# Patient Record
Sex: FEMALE | Race: WHITE | Employment: FULL TIME | URBAN - METROPOLITAN AREA
[De-identification: names, ages, dates, MRNs, and addresses within clinical notes are randomized per-mention and may not be internally consistent; named-entity substitution may affect disease eponyms.]

---

## 2019-05-16 ENCOUNTER — HOSPITAL ENCOUNTER (EMERGENCY)
Facility: CLINIC | Age: 54
Discharge: HOME | End: 2019-05-16
Attending: EMERGENCY MEDICINE | Admitting: EMERGENCY MEDICINE
Payer: COMMERCIAL

## 2019-05-16 ENCOUNTER — APPOINTMENT (OUTPATIENT)
Dept: CT IMAGING | Facility: CLINIC | Age: 54
End: 2019-05-16
Payer: COMMERCIAL

## 2019-05-16 VITALS
SYSTOLIC BLOOD PRESSURE: 117 MMHG | RESPIRATION RATE: 18 BRPM | TEMPERATURE: 99.2 F | HEIGHT: 64 IN | WEIGHT: 180 LBS | BODY MASS INDEX: 30.73 KG/M2 | HEART RATE: 75 BPM | OXYGEN SATURATION: 99 % | DIASTOLIC BLOOD PRESSURE: 54 MMHG

## 2019-05-16 DIAGNOSIS — N20.1 URETEROLITHIASIS: ICD-10-CM

## 2019-05-16 DIAGNOSIS — R10.9 LEFT FLANK PAIN: ICD-10-CM

## 2019-05-16 LAB
ALBUMIN UR-MCNC: 50 MG/DL
ANION GAP SERPL CALCULATED.3IONS-SCNC: 9 MMOL/L (ref 3–14)
APPEARANCE UR: ABNORMAL
BACTERIA #/AREA URNS HPF: ABNORMAL /HPF
BASOPHILS # BLD AUTO: 0.1 10E9/L (ref 0–0.2)
BASOPHILS NFR BLD AUTO: 0.4 %
BILIRUB UR QL STRIP: NEGATIVE
BUN SERPL-MCNC: 13 MG/DL (ref 7–30)
CALCIUM SERPL-MCNC: 8.6 MG/DL (ref 8.5–10.1)
CHLORIDE SERPL-SCNC: 107 MMOL/L (ref 94–109)
CO2 SERPL-SCNC: 23 MMOL/L (ref 20–32)
COLOR UR AUTO: YELLOW
CREAT SERPL-MCNC: 0.93 MG/DL (ref 0.52–1.04)
DIFFERENTIAL METHOD BLD: ABNORMAL
EOSINOPHIL # BLD AUTO: 0.1 10E9/L (ref 0–0.7)
EOSINOPHIL NFR BLD AUTO: 0.5 %
ERYTHROCYTE [DISTWIDTH] IN BLOOD BY AUTOMATED COUNT: 12.4 % (ref 10–15)
GFR SERPL CREATININE-BSD FRML MDRD: 71 ML/MIN/{1.73_M2}
GLUCOSE SERPL-MCNC: 99 MG/DL (ref 70–99)
GLUCOSE UR STRIP-MCNC: NEGATIVE MG/DL
HCG UR QL: NEGATIVE
HCT VFR BLD AUTO: 44.3 % (ref 35–47)
HGB BLD-MCNC: 14.7 G/DL (ref 11.7–15.7)
HGB UR QL STRIP: ABNORMAL
IMM GRANULOCYTES # BLD: 0.1 10E9/L (ref 0–0.4)
IMM GRANULOCYTES NFR BLD: 0.7 %
KETONES UR STRIP-MCNC: NEGATIVE MG/DL
LEUKOCYTE ESTERASE UR QL STRIP: ABNORMAL
LYMPHOCYTES # BLD AUTO: 1.7 10E9/L (ref 0.8–5.3)
LYMPHOCYTES NFR BLD AUTO: 13.8 %
MCH RBC QN AUTO: 31.1 PG (ref 26.5–33)
MCHC RBC AUTO-ENTMCNC: 33.2 G/DL (ref 31.5–36.5)
MCV RBC AUTO: 94 FL (ref 78–100)
MONOCYTES # BLD AUTO: 0.5 10E9/L (ref 0–1.3)
MONOCYTES NFR BLD AUTO: 3.9 %
MUCOUS THREADS #/AREA URNS LPF: PRESENT /LPF
NEUTROPHILS # BLD AUTO: 9.8 10E9/L (ref 1.6–8.3)
NEUTROPHILS NFR BLD AUTO: 80.7 %
NITRATE UR QL: NEGATIVE
NRBC # BLD AUTO: 0 10*3/UL
NRBC BLD AUTO-RTO: 0 /100
PH UR STRIP: 5.5 PH (ref 5–7)
PLATELET # BLD AUTO: 319 10E9/L (ref 150–450)
POTASSIUM SERPL-SCNC: 3.9 MMOL/L (ref 3.4–5.3)
RBC # BLD AUTO: 4.73 10E12/L (ref 3.8–5.2)
RBC #/AREA URNS AUTO: >182 /HPF (ref 0–2)
SODIUM SERPL-SCNC: 139 MMOL/L (ref 133–144)
SOURCE: ABNORMAL
SP GR UR STRIP: 1.03 (ref 1–1.03)
SQUAMOUS #/AREA URNS AUTO: 5 /HPF (ref 0–1)
UROBILINOGEN UR STRIP-MCNC: NORMAL MG/DL (ref 0–2)
WBC # BLD AUTO: 12.2 10E9/L (ref 4–11)
WBC #/AREA URNS AUTO: 14 /HPF (ref 0–5)

## 2019-05-16 PROCEDURE — 96365 THER/PROPH/DIAG IV INF INIT: CPT

## 2019-05-16 PROCEDURE — 81001 URINALYSIS AUTO W/SCOPE: CPT | Performed by: PHYSICIAN ASSISTANT

## 2019-05-16 PROCEDURE — 87086 URINE CULTURE/COLONY COUNT: CPT | Performed by: PHYSICIAN ASSISTANT

## 2019-05-16 PROCEDURE — 96361 HYDRATE IV INFUSION ADD-ON: CPT

## 2019-05-16 PROCEDURE — 81025 URINE PREGNANCY TEST: CPT | Performed by: PHYSICIAN ASSISTANT

## 2019-05-16 PROCEDURE — 25000128 H RX IP 250 OP 636: Performed by: PHYSICIAN ASSISTANT

## 2019-05-16 PROCEDURE — 36415 COLL VENOUS BLD VENIPUNCTURE: CPT | Performed by: PHYSICIAN ASSISTANT

## 2019-05-16 PROCEDURE — 96375 TX/PRO/DX INJ NEW DRUG ADDON: CPT

## 2019-05-16 PROCEDURE — 99285 EMERGENCY DEPT VISIT HI MDM: CPT | Mod: 25

## 2019-05-16 PROCEDURE — 80048 BASIC METABOLIC PNL TOTAL CA: CPT | Performed by: PHYSICIAN ASSISTANT

## 2019-05-16 PROCEDURE — 74176 CT ABD & PELVIS W/O CONTRAST: CPT

## 2019-05-16 PROCEDURE — 25000132 ZZH RX MED GY IP 250 OP 250 PS 637: Performed by: PHYSICIAN ASSISTANT

## 2019-05-16 PROCEDURE — 25000128 H RX IP 250 OP 636: Performed by: EMERGENCY MEDICINE

## 2019-05-16 PROCEDURE — 85025 COMPLETE CBC W/AUTO DIFF WBC: CPT | Performed by: PHYSICIAN ASSISTANT

## 2019-05-16 RX ORDER — HYDROCODONE BITARTRATE AND ACETAMINOPHEN 5; 325 MG/1; MG/1
1 TABLET ORAL EVERY 6 HOURS PRN
Qty: 10 TABLET | Refills: 0 | Status: SHIPPED | OUTPATIENT
Start: 2019-05-16 | End: 2019-05-19

## 2019-05-16 RX ORDER — CEFTRIAXONE 1 G/1
1 INJECTION, POWDER, FOR SOLUTION INTRAMUSCULAR; INTRAVENOUS ONCE
Status: COMPLETED | OUTPATIENT
Start: 2019-05-16 | End: 2019-05-16

## 2019-05-16 RX ORDER — TAMSULOSIN HYDROCHLORIDE 0.4 MG/1
0.4 CAPSULE ORAL DAILY
Qty: 10 CAPSULE | Refills: 0 | Status: SHIPPED | OUTPATIENT
Start: 2019-05-16 | End: 2019-05-26

## 2019-05-16 RX ORDER — KETOROLAC TROMETHAMINE 30 MG/ML
30 INJECTION, SOLUTION INTRAMUSCULAR; INTRAVENOUS ONCE
Status: COMPLETED | OUTPATIENT
Start: 2019-05-16 | End: 2019-05-16

## 2019-05-16 RX ORDER — ACETAMINOPHEN 325 MG/1
975 TABLET ORAL ONCE
Status: COMPLETED | OUTPATIENT
Start: 2019-05-16 | End: 2019-05-16

## 2019-05-16 RX ADMIN — CEFTRIAXONE SODIUM 1 G: 1 INJECTION, POWDER, FOR SOLUTION INTRAMUSCULAR; INTRAVENOUS at 13:49

## 2019-05-16 RX ADMIN — KETOROLAC TROMETHAMINE 30 MG: 30 INJECTION INTRAMUSCULAR; INTRAVENOUS at 12:02

## 2019-05-16 RX ADMIN — ACETAMINOPHEN 975 MG: 325 TABLET, FILM COATED ORAL at 12:04

## 2019-05-16 RX ADMIN — SODIUM CHLORIDE 1000 ML: 9 INJECTION, SOLUTION INTRAVENOUS at 12:05

## 2019-05-16 ASSESSMENT — ENCOUNTER SYMPTOMS
ABDOMINAL PAIN: 1
DIARRHEA: 1
HEMATURIA: 0
BLOOD IN STOOL: 0
NAUSEA: 0
FEVER: 0
VOMITING: 0
CONSTIPATION: 1
DYSURIA: 0
FREQUENCY: 0

## 2019-05-16 ASSESSMENT — MIFFLIN-ST. JEOR: SCORE: 1416.47

## 2019-05-16 NOTE — ED TRIAGE NOTES
Pt with left flank pain which started an hour PTA, denies injury, denies n/v. Denies hx of stones. ABC's intact, alert and oriented x3.

## 2019-05-16 NOTE — ED AVS SNAPSHOT
[facility] Emergency Department  [address]  [facility] [address]  Phone:  [phone]  Fax:  [phone]                                    Anna Santos   MRN: [id number]    Department:  [facility] Emergency Department   Date of Visit:  5/16/2019           After Visit Summary Signature Page    I have received my discharge instructions, and my questions have been answered. I have discussed any challenges I see with this plan with the nurse or doctor.    ..........................................................................................................................................  Patient/Patient Representative Signature      ..........................................................................................................................................  Patient Representative Print Name and Relationship to Patient    ..................................................               ................................................  Date                                   Time    ..........................................................................................................................................  Reviewed by Signature/Title    ...................................................              ..............................................  Date                                               Time          22EPIC Rev 08/18

## 2019-05-16 NOTE — ED PROVIDER NOTES
History     Chief Complaint:    Abdominal Pain    HPI   Anna Santos is a 53 year old female who presents with abdominal pain. The patient report that 1 hour ago she was having abdominal discomfort therefore tried to have a bowel movement when she suddenly felt a 10/10 severity of pain in her left abdomin that she describes is worse than childbirth. The patient notes that she did have a bowel movement that was hard but without noted blood. To combat the pain, she took 2 ibuprofen 2 hours ago. The patient denies hematuria, dysuria,  fever, nausea, vomiting, frequency, urgency, vaginal discharge, odor, or itching, history of abdominal surgeries, or history of diverticulitis. Over the weekend and past few days, the patient notes that her bowel movements were inconsistent with constipation and diarrhea intermittently compared to her normal daily bowel movements. She notes that she occasionally has a menstrual cycle, most recently 1 month ago. The patient has a history of ovarian cysts that ruptured but this pain is worse and she recalls a possible kidney stone in 2007. Of note, she had a chiropractor appointment yesterday for her back and hip.     Allergies:  No known drug allergies.       Medications:    Aspirin    Past Medical History:    Medical history reviewed. No pertinent medical history.      Past Surgical History:    Orthopedic surgery     Family History:    Family history reviewed. No pertinent family history.     Social History:  The patient was accompanied to the ED by .  Smoking Status: Never Smoker  Smokeless Tobacco: Never Used  Alcohol Use: Positive  Drug Use: Negative  PCP: Quinten Dahl   Marital Status:        Review of Systems   Constitutional: Negative for fever.   Gastrointestinal: Positive for abdominal pain, constipation and diarrhea. Negative for blood in stool, nausea and vomiting.   Genitourinary: Negative for dysuria, frequency, hematuria, urgency and vaginal  "discharge (or itching, or odor).     Physical Exam     Patient Vitals for the past 24 hrs:   BP Temp Temp src Pulse Resp SpO2 Height Weight   05/16/19 1400 117/54 -- -- 75 -- 99 % -- --   05/16/19 1345 124/63 -- -- -- -- -- -- --   05/16/19 1054 134/69 99.2  F (37.3  C) Temporal 81 18 100 % 1.626 m (5' 4\") 81.6 kg (180 lb)      Physical Exam  General: Resting on the bed.  Appears mildly uncomfortable.  Skin: Good turgor, no rash, no unusual bruising or prominent lesions.  HEENT: Head: Normocephalic, atraumatic, no visible masses.   Eyes: Conjunctiva clear.  Throat/pharynx: Mucous membranes moist, no mucosal lesions.   Cardiac: Normal rate and regular rhythm, no murmur or gallop.   Lungs: Clear to auscultation.  Abdomen: Mild tenderness palpation in the left lower quadrant.  Abdomen soft, no rebound or guarding.  No CVA tenderness bilaterally.  Musculoskeletal: Normal gait and station.  Neurologic: Oriented x 3. GCS: 15.  Psychiatric: Intact recent and remote memory, judgment and insight, normal mood and affect.     Emergency Department Course     Imaging:  Radiology findings were communicated with the patient who voiced understanding of the findings.    CT Abdomen Pelvis w/o Contrast   Preliminary Result   IMPRESSION:   1. Left hydronephrosis and hydroureter related to a 5 mm calculus in   the distal left ureter.   2. Multiple hypodense liver lesions are too small to characterize but   statistically likely to be benign.        Reading per radiology     Laboratory:  Laboratory findings were communicated with the patient who voiced understanding of the findings.    CBC: WBC 12.2 (H), HGB 14.7,   BMP: WNL (Creatinine 0.93)  UA: Blood large (A), Protein Albumin 50 (A), Leukocyte Esterase small (A), WBC 14 (H), RBC >182 (H), Bacteria few (A), Squamous Epithelial 5 (H), Mucous present (A), o/w WNL.  Urine Culture: Pending  HCG Qualitative: Negative     Interventions:  1202 Toradol 30 mg IV  1204 Tylenol 975 mg " PO  1205 NS 1000 mL IV  1349 Rocephin 1 g IV    Emergency Department Course:     Nursing notes and vitals reviewed.    1115 I performed an exam of the patient as documented above.     1152 The patient provided a urine sample here in the emergency department. This was sent for laboratory testing, findings above.     1226 IV was inserted and blood was drawn for laboratory testing, results above.     1300 The patient was sent for a CT while in the emergency department, results above.      1402 I spoke with Dr. Pulliam of the urology service regarding patient's presentation, findings, and plan of care.     1410 I personally reviewed the imaging and lab results with the patient and answered all related questions prior to discharge.    Impression & Plan      Medical Decision Making:  Anna Santos is a 53 year old female who presents to the emergency department today for evaluation of  unilateral flank and abdominal pain consistent with renal colic. CT confirms a ureteral stone. Pain is controlled with interventions in the Emergency Department.  The patient did have a mild temperature elevation and leukocytosis at 12.2 on laboratory analysis.  There is also small amount of leukocyte esterase, WBCs, bacteria in her UA.  Due to these initial results, she was given a dose of ceftriaxone here.  I think consulted with Dr. Hayes of urology in regards to the patient's presentation and work-up.  He did not believe that these were signs of infected stone.  He did not advise antibiotics but follow-up with urology as needed.  The urine has been sent for culture and is pending.  The patient will be discharged with opioid analgesics and Ibuprofen for pain. Side effects of narcotic medications were discussed. Flomax will be prescribed daily to attempt to ease stone passage.  Other etiologies for these symptoms such as AAA, pyelonephritis, muscular strain, appendicitis, amongst others are considered and have been excluded.  The patient  was told to return if if increasing pain not controlled with pain meds, vomiting, and fever.  Follow up with urology within one week if she does not pass the stone, sooner if pain continues.  She was given a strainer for her urine.  All questions were answered prior to the patient's discharge. She was in agreement with the plan stated above.     Diagnosis:    ICD-10-CM    1. Ureterolithiasis N20.1 Urine Culture   2. Left flank pain R10.9       Disposition:   The patient is discharged to home.     Discharge Medications:       START taking      Dose / Directions   tamsulosin 0.4 MG capsule  Commonly known as:  FLOMAX      Dose:  0.4 mg  Take 1 capsule (0.4 mg) by mouth daily for 10 doses  Quantity:  10 capsule  Refills:  0        CONTINUE these medicines which may have CHANGED, or have new prescriptions. If we are uncertain of the size of tablets/capsules you have at home, strength may be listed as something that might have changed.      Dose / Directions   HYDROcodone-acetaminophen 5-325 MG tablet  Commonly known as:  NORCO  This may have changed:      how much to take    reasons to take this      Dose:  1 tablet  Take 1 tablet by mouth every 6 hours as needed for severe pain  Quantity:  10 tablet  Refills:  0           Where to get your medicines      Some of these will need a paper prescription and others can be bought over the counter. Ask your nurse if you have questions.    Bring a paper prescription for each of these medications    HYDROcodone-acetaminophen 5-325 MG tablet    tamsulosin 0.4 MG capsule         Scribe Disclosure:  I, Orla Severson, am serving as a scribe at 11:21 AM on 5/16/2019 to document services personally performed by Mari GARCIA based on my observations and the provider's statements to me.     [facility] EMERGENCY DEPARTMENT    This was created at least in part with a voice recognition software. Mistakes/typos may be present.        Mari Abraham PA  05/16/19  [id number]

## 2019-05-16 NOTE — DISCHARGE INSTRUCTIONS
Begin taking the Flomax at home.  Use ibuprofen for pain control.  If pain is not controlled, use the Norco.  Return for fevers, chills, worsening symptoms.  Strain your urine at home.  If you do not pass the stone in the next 2 days, follow-up with Dr. Hayes, information above.  Your urine has been sent for culture.  He will get called if results show bacteria.       Discharge Instructions  Kidney Stones    Kidney stones are a common problem that can cause a lot of pain but fortunately are usually not dangerous and can be generally treated with medicine at home.  However, sometimes your condition may be worse than it seemed at first, or may get worse with time.     You need to follow-up with your regular doctor within 3 days.    Most kidney stones will pass on their own, but occasionally stones may need to be removed by an urologist. We will send you home with a urine strainer. Be sure to urinate into this, or urinate into a container and pour the urine through the fine filter to catch the kidney stone as it comes out. The stone will seem like a pebble or grain of sand. Be sure to save this in a Ziploc  bag and take it to the doctor?s office with you.       Return to the Emergency Department if:  Your pain is not controlled.  You are vomiting and can?t keep fluids or medications down.  You develop fever (>101).  You feel much more ill or develop new symptoms.  What can I do to help myself?  Be sure to drink plenty of fluids.  Staying active is good, and may help the stone to pass. You may do whatever you feel up to doing without restrictions.   Treatment:  Non-steroidal anti-inflammatory drugs (NSAIDs). This includes prescription medicines like Toradol  (ketorolac) and non-prescription medicines like Advil  (ibuprofen) and Nuprin  (ibuprofen). These pain relievers are very effective for kidney stones.  Narcotic pain pills. If you have been given a narcotic such as Vicodin  (hydrocodone with acetaminophen),  Percocet  (oxycodone with acetaminophen), or codeine, do not drive for four hours after you have taken it. If the narcotic contains Tylenol  (acetaminophen), do not take Tylenol  with it. All narcotics will cause constipation, so eat a high fiber diet.    Nausea medication.  Nausea and vomiting are common with kidney stones, so your physician may send you home with medicine for this.   Flomax  (tamsulosin). This medicine is sometimes used for men with prostate problems, but also can help kidney stones to pass. This medicine can lower blood pressure, and you may feel faint, especially when you first stand up. Be sure to get up gradually, sit down if you feel faint, and avoid activity where feeling faint would be dangerous, such as climbing ladders.   If you were given a prescription for medicine here today, be sure to read all of the information (including the package insert) that comes with your prescription.  This will include important information about the medicine, its side effects, and any warnings that you need to know about.  The pharmacist who fills the prescription can provide more information and answer questions you may have about the medicine.  If you have questions or concerns that the pharmacist cannot address, please call or return to the Emergency Department.   Opioid Medication Information    Pain medications are among the most commonly prescribed medicines, so we are including this information for all our patients. If you did not receive pain medication or get a prescription for pain medicine, you can ignore it.     You may have been given a prescription for an opioid (narcotic) pain medicine and/or have received a pain medicine while here in the Emergency Department. These medicines can make you drowsy or impaired. You must not drive, operate dangerous equipment, or engage in any other dangerous activities while taking these medications. If you drive while taking these medications, you could be  arrested for DUI, or driving under the influence. Do not drink any alcohol while you are taking these medications.     Opioid pain medications can cause addiction. If you have a history of chemical dependency of any type, you are at a higher risk of becoming addicted to pain medications.  Only take these prescribed medications to treat your pain when all other options have been tried. Take it for as short a time and as few doses as possible. Store your pain pills in a secure place, as they are frequently stolen and provide a dangerous opportunity for children or visitors in your house to start abusing these powerful medications. We will not replace any lost or stolen medicine.  As soon as your pain is better, you should flush all your remaining medication.     Many prescription pain medications contain Tylenol  (acetaminophen), including Vicodin , Tylenol #3 , Norco , Lortab , and Percocet .  You should not take any extra pills of Tylenol  if you are using these prescription medications or you can get very sick.  Do not ever take more than 3000 mg of acetaminophen in any 24 hour period.    All opioids tend to cause constipation. Drink plenty of water and eat foods that have a lot of fiber, such as fruits, vegetables, prune juice, apple juice and high fiber cereal.  Take a laxative if you don?t move your bowels at least every other day. Miralax , Milk of Magnesia, Colace , or Senna  can be used to keep you regular.      Remember that you can always come back to the Emergency Department if you are not able to see your regular doctor in the amount of time listed above, if you get any new symptoms, or if there is anything that worries you.

## 2019-05-17 LAB
BACTERIA SPEC CULT: NORMAL
BACTERIA SPEC CULT: NORMAL
Lab: NORMAL
SPECIMEN SOURCE: NORMAL

## 2019-05-20 ENCOUNTER — HOSPITAL ENCOUNTER (OUTPATIENT)
Dept: GENERAL RADIOLOGY | Facility: CLINIC | Age: 54
Discharge: HOME | End: 2019-05-20
Attending: UROLOGY | Admitting: UROLOGY
Payer: COMMERCIAL

## 2019-05-20 DIAGNOSIS — N20.0 KIDNEY STONE: ICD-10-CM

## 2019-05-20 DIAGNOSIS — N20.0 CALCULUS OF KIDNEY: Primary | ICD-10-CM

## 2019-05-20 DIAGNOSIS — N20.0 KIDNEY STONE: Primary | ICD-10-CM

## 2019-05-20 PROCEDURE — 74019 RADEX ABDOMEN 2 VIEWS: CPT

## 2019-05-21 ENCOUNTER — OFFICE VISIT (OUTPATIENT)
Dept: UROLOGY | Facility: CLINIC | Age: 54
End: 2019-05-21
Payer: COMMERCIAL

## 2019-05-21 VITALS
HEART RATE: 70 BPM | HEIGHT: 64 IN | DIASTOLIC BLOOD PRESSURE: 80 MMHG | BODY MASS INDEX: 30.73 KG/M2 | OXYGEN SATURATION: 98 % | WEIGHT: 180 LBS | SYSTOLIC BLOOD PRESSURE: 118 MMHG

## 2019-05-21 DIAGNOSIS — N20.0 CALCULUS OF KIDNEY: ICD-10-CM

## 2019-05-21 DIAGNOSIS — N20.1 URETERAL STONE: Primary | ICD-10-CM

## 2019-05-21 LAB
ALBUMIN UR-MCNC: NEGATIVE MG/DL
APPEARANCE UR: CLEAR
BILIRUB UR QL STRIP: NEGATIVE
COLOR UR AUTO: YELLOW
GLUCOSE UR STRIP-MCNC: NEGATIVE MG/DL
HGB UR QL STRIP: ABNORMAL
KETONES UR STRIP-MCNC: NEGATIVE MG/DL
LEUKOCYTE ESTERASE UR QL STRIP: NEGATIVE
NITRATE UR QL: NEGATIVE
PH UR STRIP: 7 PH (ref 5–7)
SOURCE: ABNORMAL
SP GR UR STRIP: 1.02 (ref 1–1.03)
UROBILINOGEN UR STRIP-ACNC: 0.2 EU/DL (ref 0.2–1)

## 2019-05-21 PROCEDURE — 81003 URINALYSIS AUTO W/O SCOPE: CPT | Performed by: UROLOGY

## 2019-05-21 PROCEDURE — 99203 OFFICE O/P NEW LOW 30 MIN: CPT | Performed by: UROLOGY

## 2019-05-21 ASSESSMENT — MIFFLIN-ST. JEOR: SCORE: 1416.47

## 2019-05-21 ASSESSMENT — PAIN SCALES - GENERAL: PAINLEVEL: NO PAIN (0)

## 2019-05-21 NOTE — NURSING NOTE
Chief Complaint   Patient presents with     Clinic Care Coordination - Follow-up     Pt here for kidney stone     Ashlee Mayfield, CMA

## 2019-05-21 NOTE — PROGRESS NOTES
[facility]  Main Office: [address]  [address]  [place] [address]       CHIEF COMPLAINT:   Left ureteral stone    HISTORY:   I was asked by Mari Abraham in the emergency department to see this 53-year-old woman who was in the emergency department last week with left-sided Abdominal pain. She had a CT scan and was diagnosed with a 5 mm distal left ureteral stone. There were no other stones present in the kidneys. Her pain was controlled in the emergency department. Her urinalysis showed hematuria but otherwise no signs of infection. She was discharged with pain medication and Flomax. Since leaving the emergency department she has done very well. She has had no pain whatsoever. She is asymptomatic from the stone. No fevers chills nausea or vomiting.      PAST MEDICAL HISTORY:   Past Medical History:   Diagnosis Date     STD (sexually transmitted disease)        PAST SURGICAL HISTORY:     Past Surgical History:   Procedure Laterality Date     ORTHOPEDIC SURGERY      back       FAMILY HISTORY:   History reviewed. No pertinent family history.    SOCIAL HISTORY:   Social History     Tobacco Use     Smoking status: Never Smoker     Smokeless tobacco: Never Used   Substance Use Topics     Alcohol use: Not on file        ALLERGIES:  No Known Allergies    MEDICATIONS:     Current Outpatient Medications:      ASPIRIN PO, , Disp: , Rfl:      tamsulosin (FLOMAX) 0.4 MG capsule, Take 1 capsule (0.4 mg) by mouth daily for 10 doses, Disp: 10 capsule, Rfl: 0     Cholecalciferol (VITAMIN D3 PO), Take by mouth daily, Disp: , Rfl:      UNKNOWN TO PATIENT, , Disp: , Rfl:     REVIEW OF SYSTEMS:  Allergic/Immunologic: negative  Constitutional Symptoms: negative   Fever: none   Weight loss: none   Other: none  Hematologic/Lymphatic: negative  Integumentary: negative   Breast: negative   Hair: negative   Skin: negative   Skin: negative  Eyes: negative  Ears/Nose/Throat: negative  Respiratory: negative  Cardiovascular:  negative  Gastrointestinal: negative  Genitourinary: negative  Musculoskeletal: negative  Neurologic: negative  Psychiatric: negative  Reproductive System: negative  Endocrine: negative      PHYSICAL EXAM:  VS: HR: Data Unavailable    BP: Data Unavailable      WT: 0 lbs 0 oz       HT: Data Unavailable    General appearance: In NAD, conversant  HEENT: normocephalic and atraumatic, anicteric sclera  Lymph Nodes: not examined  Cardiovascular: not examined  Respiratory: normal, non-labored breathing  Abdomen: soft, non-tender, and non-distended  Back/Flank: not examined  Peripheral Vascular/extremity: no peripheral edema  Lymphatic: not examined  Skin: Normal temperature, turgor, and texture. No rash  Psychiatric: Appropriate affect. Alert and Oriented to person, place, and time    Pelvic:    External genitalia: not examined   Urethra: not examined   Vagina: not examined      Cystoscopy:     Laboratory Studies:     Imaging Studies: I reviewed her CT scan images and her KUB images from today.5 mm distal left ureteral stone seen in both images. Unchanged in position.      CLINICAL IMPRESSION:   Distal left ureteral stone    PLAN:   She has a 5 mm distal left ureteral stone. No other stones present.The stone appears to still be present on the KUB today. We discussed options of trial of passage versus ureteroscopy. We discussed the pros and cons of each approach. Since she is not having any pain she would like to continue with trial of passage. She'll continue to take the Flomax. She will take pain medication as needed if she has more episodes of pain.She will strain her urine. If she does not pass the stone by 1 month for now I will see her back at that time for another KUB and urinalysis.      Chet Kingston M.D.

## 2019-05-21 NOTE — LETTER
5/21/2019     RE: Anna Santos  [address] [place] [address]     Dear Colleague,    Thank you for referring your patient, Anna Santos, to the [facility] [place] [facility]. Please see a copy of my visit note below.    [facility]  Main Office: [address]  [address]  [place] [address]       CHIEF COMPLAINT:   Left ureteral stone    HISTORY:   I was asked by Mari Abraham in the emergency department to see this 53-year-old woman who was in the emergency department last week with left-sided Abdominal pain. She had a CT scan and was diagnosed with a 5 mm distal left ureteral stone. There were no other stones present in the kidneys. Her pain was controlled in the emergency department. Her urinalysis showed hematuria but otherwise no signs of infection. She was discharged with pain medication and Flomax. Since leaving the emergency department she has done very well. She has had no pain whatsoever. She is asymptomatic from the stone. No fevers chills nausea or vomiting.      PAST MEDICAL HISTORY:   Past Medical History:   Diagnosis Date     STD (sexually transmitted disease)        PAST SURGICAL HISTORY:     Past Surgical History:   Procedure Laterality Date     ORTHOPEDIC SURGERY      back       FAMILY HISTORY:   History reviewed. No pertinent family history.    SOCIAL HISTORY:   Social History     Tobacco Use     Smoking status: Never Smoker     Smokeless tobacco: Never Used   Substance Use Topics     Alcohol use: Not on file        ALLERGIES:  No Known Allergies    MEDICATIONS:     Current Outpatient Medications:      ASPIRIN PO, , Disp: , Rfl:      tamsulosin (FLOMAX) 0.4 MG capsule, Take 1 capsule (0.4 mg) by mouth daily for 10 doses, Disp: 10 capsule, Rfl: 0     Cholecalciferol (VITAMIN D3 PO), Take by mouth daily, Disp: , Rfl:      UNKNOWN TO PATIENT, , Disp: , Rfl:     REVIEW OF  SYSTEMS:  Allergic/Immunologic: negative  Constitutional Symptoms: negative   Fever: none   Weight loss: none   Other: none  Hematologic/Lymphatic: negative  Integumentary: negative   Breast: negative   Hair: negative   Skin: negative   Skin: negative  Eyes: negative  Ears/Nose/Throat: negative  Respiratory: negative  Cardiovascular: negative  Gastrointestinal: negative  Genitourinary: negative  Musculoskeletal: negative  Neurologic: negative  Psychiatric: negative  Reproductive System: negative  Endocrine: negative      PHYSICAL EXAM:  VS: HR: Data Unavailable    BP: Data Unavailable      WT: 0 lbs 0 oz       HT: Data Unavailable    General appearance: In NAD, conversant  HEENT: normocephalic and atraumatic, anicteric sclera  Lymph Nodes: not examined  Cardiovascular: not examined  Respiratory: normal, non-labored breathing  Abdomen: soft, non-tender, and non-distended  Back/Flank: not examined  Peripheral Vascular/extremity: no peripheral edema  Lymphatic: not examined  Skin: Normal temperature, turgor, and texture. No rash  Psychiatric: Appropriate affect. Alert and Oriented to person, place, and time    Pelvic:    External genitalia: not examined   Urethra: not examined   Vagina: not examined      Cystoscopy:     Laboratory Studies:     Imaging Studies: I reviewed her CT scan images and her KUB images from today.5 mm distal left ureteral stone seen in both images. Unchanged in position.      CLINICAL IMPRESSION:   Distal left ureteral stone    PLAN:   She has a 5 mm distal left ureteral stone. No other stones present.The stone appears to still be present on the KUB today. We discussed options of trial of passage versus ureteroscopy. We discussed the pros and cons of each approach. Since she is not having any pain she would like to continue with trial of passage. She'll continue to take the Flomax. She will take pain medication as needed if she has more episodes of pain.She will strain her urine. If she does  not pass the stone by 1 month for now I will see her back at that time for another KUB and urinalysis.      Chet Kingston M.D.

## 2019-05-23 DIAGNOSIS — N20.1 URETERAL STONE: Primary | ICD-10-CM

## 2019-05-23 RX ORDER — TAMSULOSIN HYDROCHLORIDE 0.4 MG/1
0.4 CAPSULE ORAL DAILY
Qty: 14 CAPSULE | Refills: 1 | Status: SHIPPED | OUTPATIENT
Start: 2019-05-23

## 2019-06-18 DIAGNOSIS — Z87.442 HISTORY OF RENAL CALCULI: ICD-10-CM

## 2019-06-18 DIAGNOSIS — N20.0 KIDNEY STONE: Primary | ICD-10-CM

## 2020-06-10 ENCOUNTER — TRANSFERRED RECORDS (OUTPATIENT)
Dept: HEALTH INFORMATION MANAGEMENT | Facility: CLINIC | Age: 55
End: 2020-06-10

## 2020-06-11 LAB
CHOLEST SERPL-MCNC: 224 MG/DL (ref 100–199)
EJECTION FRACTION: 62 %
HDLC SERPL-MCNC: 47 MG/DL
LDLC SERPL CALC-MCNC: 135 MG/DL
NONHDLC SERPL-MCNC: 177 MG/DL
TRIGL SERPL-MCNC: 212 MG/DL

## 2020-06-12 LAB — EJECTION FRACTION: 57 %

## 2020-07-08 ENCOUNTER — HOSPITAL ENCOUNTER (OUTPATIENT)
Dept: CARDIAC REHAB | Facility: CLINIC | Age: 55
End: 2020-07-08
Attending: PHYSICIAN ASSISTANT
Payer: COMMERCIAL

## 2020-07-08 PROCEDURE — 93798 PHYS/QHP OP CAR RHAB W/ECG: CPT | Performed by: REHABILITATION PRACTITIONER

## 2020-07-08 PROCEDURE — 40000116 ZZH STATISTIC OP CR VISIT: Performed by: REHABILITATION PRACTITIONER

## 2020-07-08 PROCEDURE — 93797 PHYS/QHP OP CAR RHAB WO ECG: CPT | Performed by: REHABILITATION PRACTITIONER

## 2020-07-08 PROCEDURE — 40000575 ZZH STATISTIC OP CARDIAC VISIT #2: Performed by: REHABILITATION PRACTITIONER

## 2020-07-10 ENCOUNTER — HOSPITAL ENCOUNTER (OUTPATIENT)
Dept: CARDIAC REHAB | Facility: CLINIC | Age: 55
End: 2020-07-10
Attending: INTERNAL MEDICINE
Payer: COMMERCIAL

## 2020-07-10 PROCEDURE — 93798 PHYS/QHP OP CAR RHAB W/ECG: CPT

## 2020-07-10 PROCEDURE — 40000116 ZZH STATISTIC OP CR VISIT

## 2020-07-16 ENCOUNTER — HOSPITAL ENCOUNTER (OUTPATIENT)
Dept: CARDIAC REHAB | Facility: CLINIC | Age: 55
End: 2020-07-16
Attending: INTERNAL MEDICINE
Payer: COMMERCIAL

## 2020-07-16 PROCEDURE — 93798 PHYS/QHP OP CAR RHAB W/ECG: CPT

## 2020-07-16 PROCEDURE — 40000116 ZZH STATISTIC OP CR VISIT

## 2020-07-21 ENCOUNTER — HOSPITAL ENCOUNTER (OUTPATIENT)
Dept: CARDIAC REHAB | Facility: CLINIC | Age: 55
End: 2020-07-21
Attending: INTERNAL MEDICINE
Payer: COMMERCIAL

## 2020-07-21 PROCEDURE — 40000116 ZZH STATISTIC OP CR VISIT

## 2020-07-21 PROCEDURE — 93798 PHYS/QHP OP CAR RHAB W/ECG: CPT

## 2020-07-23 ENCOUNTER — HOSPITAL ENCOUNTER (OUTPATIENT)
Dept: CARDIAC REHAB | Facility: CLINIC | Age: 55
End: 2020-07-23
Attending: INTERNAL MEDICINE
Payer: COMMERCIAL

## 2020-07-23 PROCEDURE — 40000116 ZZH STATISTIC OP CR VISIT: Performed by: OCCUPATIONAL THERAPIST

## 2020-07-23 PROCEDURE — 93798 PHYS/QHP OP CAR RHAB W/ECG: CPT | Performed by: OCCUPATIONAL THERAPIST

## 2020-07-28 ENCOUNTER — HOSPITAL ENCOUNTER (OUTPATIENT)
Dept: CARDIAC REHAB | Facility: CLINIC | Age: 55
End: 2020-07-28
Attending: INTERNAL MEDICINE
Payer: COMMERCIAL

## 2020-07-28 PROCEDURE — 40000116 ZZH STATISTIC OP CR VISIT

## 2020-07-28 PROCEDURE — 93798 PHYS/QHP OP CAR RHAB W/ECG: CPT

## 2020-08-04 ENCOUNTER — HOSPITAL ENCOUNTER (OUTPATIENT)
Dept: CARDIAC REHAB | Facility: CLINIC | Age: 55
End: 2020-08-04
Attending: INTERNAL MEDICINE
Payer: COMMERCIAL

## 2020-08-04 PROCEDURE — 93798 PHYS/QHP OP CAR RHAB W/ECG: CPT

## 2020-08-04 PROCEDURE — 40000116 ZZH STATISTIC OP CR VISIT

## 2020-08-06 ENCOUNTER — HOSPITAL ENCOUNTER (OUTPATIENT)
Dept: CARDIAC REHAB | Facility: CLINIC | Age: 55
End: 2020-08-06
Attending: INTERNAL MEDICINE
Payer: COMMERCIAL

## 2020-08-06 PROCEDURE — 40000116 ZZH STATISTIC OP CR VISIT

## 2020-08-06 PROCEDURE — 93798 PHYS/QHP OP CAR RHAB W/ECG: CPT

## 2020-08-11 ENCOUNTER — HOSPITAL ENCOUNTER (OUTPATIENT)
Dept: CARDIAC REHAB | Facility: CLINIC | Age: 55
End: 2020-08-11
Attending: INTERNAL MEDICINE
Payer: COMMERCIAL

## 2020-08-11 PROCEDURE — 93798 PHYS/QHP OP CAR RHAB W/ECG: CPT | Performed by: REHABILITATION PRACTITIONER

## 2020-08-11 PROCEDURE — 40000116 ZZH STATISTIC OP CR VISIT: Performed by: REHABILITATION PRACTITIONER

## 2020-08-13 ENCOUNTER — HOSPITAL ENCOUNTER (OUTPATIENT)
Dept: CARDIAC REHAB | Facility: CLINIC | Age: 55
End: 2020-08-13
Attending: INTERNAL MEDICINE
Payer: COMMERCIAL

## 2020-08-13 PROCEDURE — 93798 PHYS/QHP OP CAR RHAB W/ECG: CPT

## 2020-08-13 PROCEDURE — 40000116 ZZH STATISTIC OP CR VISIT

## 2020-08-18 ENCOUNTER — HOSPITAL ENCOUNTER (OUTPATIENT)
Dept: CARDIAC REHAB | Facility: CLINIC | Age: 55
End: 2020-08-18
Attending: INTERNAL MEDICINE
Payer: COMMERCIAL

## 2020-08-18 PROCEDURE — 93798 PHYS/QHP OP CAR RHAB W/ECG: CPT

## 2020-08-18 PROCEDURE — 40000116 ZZH STATISTIC OP CR VISIT

## 2020-08-25 ENCOUNTER — HOSPITAL ENCOUNTER (OUTPATIENT)
Dept: CARDIAC REHAB | Facility: CLINIC | Age: 55
End: 2020-08-25
Attending: INTERNAL MEDICINE
Payer: COMMERCIAL

## 2020-08-25 PROCEDURE — 93798 PHYS/QHP OP CAR RHAB W/ECG: CPT

## 2020-08-25 PROCEDURE — 40000116 ZZH STATISTIC OP CR VISIT

## 2020-08-27 ENCOUNTER — HOSPITAL ENCOUNTER (OUTPATIENT)
Dept: CARDIAC REHAB | Facility: CLINIC | Age: 55
End: 2020-08-27
Attending: INTERNAL MEDICINE
Payer: COMMERCIAL

## 2020-08-27 PROCEDURE — 40000116 ZZH STATISTIC OP CR VISIT: Performed by: OCCUPATIONAL THERAPIST

## 2020-08-27 PROCEDURE — 93798 PHYS/QHP OP CAR RHAB W/ECG: CPT | Performed by: OCCUPATIONAL THERAPIST

## 2020-08-27 PROCEDURE — 93797 PHYS/QHP OP CAR RHAB WO ECG: CPT | Performed by: OCCUPATIONAL THERAPIST

## 2020-08-27 PROCEDURE — 40000575 ZZH STATISTIC OP CARDIAC VISIT #2: Performed by: OCCUPATIONAL THERAPIST
